# Patient Record
Sex: FEMALE | Race: WHITE | Employment: UNEMPLOYED
[De-identification: names, ages, dates, MRNs, and addresses within clinical notes are randomized per-mention and may not be internally consistent; named-entity substitution may affect disease eponyms.]

---

## 2022-03-19 PROBLEM — Z89.511 HX OF RIGHT BKA (HCC): Status: ACTIVE | Noted: 2021-09-17

## 2022-03-19 PROBLEM — L89.892: Status: ACTIVE | Noted: 2021-09-17

## 2023-04-26 ENCOUNTER — HOSPITAL ENCOUNTER (OUTPATIENT)
Facility: HOSPITAL | Age: 48
Discharge: HOME | End: 2023-04-29

## 2023-04-26 LAB — LABCORP SPECIMEN COLLECTION: NORMAL

## 2023-04-26 PROCEDURE — 99001 SPECIMEN HANDLING PT-LAB: CPT

## 2023-05-17 ENCOUNTER — OFFICE VISIT (OUTPATIENT)
Age: 48
End: 2023-05-17
Payer: COMMERCIAL

## 2023-05-17 VITALS
HEART RATE: 68 BPM | DIASTOLIC BLOOD PRESSURE: 84 MMHG | SYSTOLIC BLOOD PRESSURE: 132 MMHG | WEIGHT: 104 LBS | BODY MASS INDEX: 18.43 KG/M2 | HEIGHT: 63 IN | OXYGEN SATURATION: 99 %

## 2023-05-17 DIAGNOSIS — I44.7 LBBB (LEFT BUNDLE BRANCH BLOCK): Primary | ICD-10-CM

## 2023-05-17 PROCEDURE — 99214 OFFICE O/P EST MOD 30 MIN: CPT | Performed by: INTERNAL MEDICINE

## 2023-05-17 NOTE — PROGRESS NOTES
Elis Avendaño presents today for   Chief Complaint   Patient presents with    Follow-up     Post Nuclear Stress, Echo and Event       Elis Avendaño preferred language for health care discussion is english/other. Is someone accompanying this pt? No    Is the patient using any DME equipment during OV? No      Pt currently taking Anticoagulant therapy? No    Coordination of Care:  1. Have you been to the ER, urgent care clinic since your last visit? Hospitalized since your last visit? no    2. Have you seen or consulted any other health care providers outside of the [address] [place] [address] since your last visit? Include any pap smears or colon screening.  No

## 2023-05-17 NOTE — PROGRESS NOTES
History of Present Illness:  51 YOF here for follow up. She was initially referred for left bundle branch block. Functional status is reasonable and she has a history of left leg amputation and prosthesis. She has no chest pain, dyspnea, PND, orthopnea or edema. When she was wearing the event monitor she did have one episode where she felt a little flushed and did not feel well and based upon the monitor, appears to be in sinus rhythm, sinus tachycardia. There was a short rhythm of an SVT that was not sustained for about 18 seconds. Occasional PACs that were read as PVCs. She is on Toprol. Impression:  History of left bundle branch block diagnosed March 2023 of unknown duration in the setting of mild racing heart and hypertension. Borderline hypertension, on Toprol. History of amputation and prosthesis. Echocardiogram and nuclear stress test May 2023 with septal defect likely related to bundle branch block with dyskinesis, but overall normal function. Recent event monitor with occasional PACs, PVCs, which mostly are aberrancy. There was a short run of SVT 18 seconds nonsustained. Plan: At this point we will continue to monitor and I reviewed the echocardiogram and nuclear stress test.  I see no reason to limit activity at this point. I did recommend minimizing caffeine intake and I also discussed using a home monitor if she has more events. I will plan to see back annually. All questions answered. Wt Readings from Last 3 Encounters:   05/17/23 104 lb (47.2 kg)   05/09/23 105 lb (47.6 kg)   05/09/23 105 lb (47.6 kg)     No past medical history on file.     Current Outpatient Medications   Medication Sig Dispense Refill    buPROPion (WELLBUTRIN XL) 150 MG extended release tablet Take 1 tablet by mouth every 24 hours      metoprolol succinate (TOPROL XL) 25 MG extended release tablet Take 1 tablet by mouth daily (Patient not taking: Reported on 5/17/2023)       No current facility-administered

## 2024-05-22 ENCOUNTER — OFFICE VISIT (OUTPATIENT)
Age: 49
End: 2024-05-22
Payer: COMMERCIAL

## 2024-05-22 VITALS
SYSTOLIC BLOOD PRESSURE: 130 MMHG | OXYGEN SATURATION: 93 % | DIASTOLIC BLOOD PRESSURE: 78 MMHG | BODY MASS INDEX: 19.31 KG/M2 | WEIGHT: 109 LBS | HEART RATE: 59 BPM | HEIGHT: 63 IN

## 2024-05-22 DIAGNOSIS — I44.7 LBBB (LEFT BUNDLE BRANCH BLOCK): Primary | ICD-10-CM

## 2024-05-22 DIAGNOSIS — R00.0 TACHYCARDIA, UNSPECIFIED: ICD-10-CM

## 2024-05-22 DIAGNOSIS — R07.89 OTHER CHEST PAIN: ICD-10-CM

## 2024-05-22 PROCEDURE — 99214 OFFICE O/P EST MOD 30 MIN: CPT | Performed by: INTERNAL MEDICINE

## 2024-05-22 PROCEDURE — 93000 ELECTROCARDIOGRAM COMPLETE: CPT | Performed by: INTERNAL MEDICINE

## 2024-05-22 NOTE — PROGRESS NOTES
History of Present Illness:  49 year-old female here for followup.  She was initially referred for left bundle branch block and she has a history of left leg amputation and prosthesis.  No significant change in functional status over the past year.  She does notice some palpitations every year however, different from the heart racing and she has a history of PACs that were initially read as PVCs.  She is taking Toprol 25 mg daily consistently.  No chest pain or syncope.      Impression:   History of left bundle branch block diagnosed 03/2023 of unknown duration.    Intermittent heart racing.    Mild hypertension on Toprol.    History of amputation and prosthesis.    Echocardiogram and nuclear stress test 05/2023.  Septal defect likely related to bundle branch block and dyskinesis with normal EF.    Event monitor previously with PACs, likely aberrancy.  There was one run of SVT of 18 seconds nonsustained.      Plan:  She has slight increase in some daily palpitations, heart racing at baseline, which sounds more like a sinus tachycardia.  She is taking Toprol 25 mg daily.  I am going to follow up with an event monitor this year.  She has been on Wellbutrin for many years, but it can be a potential stimulant.  There is also potential etiology for perhaps hormonal changes over time.  All questions were answered.  I will plan to see her back annually, as long as her event monitor is unremarkable and I can communicate on My Chart.         Wt Readings from Last 3 Encounters:   05/22/24 49.4 kg (109 lb)   05/17/23 47.2 kg (104 lb)   05/09/23 47.6 kg (105 lb)     No past medical history on file.    Current Outpatient Medications   Medication Sig Dispense Refill    metoprolol succinate (TOPROL XL) 25 MG extended release tablet Take 1 tablet by mouth daily      buPROPion (WELLBUTRIN XL) 150 MG extended release tablet Take 1 tablet by mouth every 24 hours       No current facility-administered medications for this visit.

## 2025-05-22 ENCOUNTER — OFFICE VISIT (OUTPATIENT)
Age: 50
End: 2025-05-22
Payer: COMMERCIAL

## 2025-05-22 VITALS
HEART RATE: 66 BPM | BODY MASS INDEX: 18.96 KG/M2 | SYSTOLIC BLOOD PRESSURE: 120 MMHG | DIASTOLIC BLOOD PRESSURE: 80 MMHG | WEIGHT: 107 LBS | HEIGHT: 63 IN

## 2025-05-22 DIAGNOSIS — I44.7 LBBB (LEFT BUNDLE BRANCH BLOCK): Primary | ICD-10-CM

## 2025-05-22 DIAGNOSIS — R07.89 OTHER CHEST PAIN: ICD-10-CM

## 2025-05-22 DIAGNOSIS — R00.0 TACHYCARDIA, UNSPECIFIED: ICD-10-CM

## 2025-05-22 DIAGNOSIS — I44.7 LEFT BUNDLE BRANCH BLOCK: ICD-10-CM

## 2025-05-22 DIAGNOSIS — I10 PRIMARY HYPERTENSION: ICD-10-CM

## 2025-05-22 PROCEDURE — 99214 OFFICE O/P EST MOD 30 MIN: CPT | Performed by: INTERNAL MEDICINE

## 2025-05-22 PROCEDURE — 93000 ELECTROCARDIOGRAM COMPLETE: CPT | Performed by: INTERNAL MEDICINE

## 2025-05-22 PROCEDURE — 3074F SYST BP LT 130 MM HG: CPT | Performed by: INTERNAL MEDICINE

## 2025-05-22 PROCEDURE — 3079F DIAST BP 80-89 MM HG: CPT | Performed by: INTERNAL MEDICINE

## 2025-05-22 NOTE — PROGRESS NOTES
HPI:  50-year-old female here for followup. She was initially referred for left bundle branch block. She had occasional palpitations, history of PACs, rare PVCs. She has been taking Toprol regularly. Actually, she has been doing quite well despite an increase in external stressors. No chest pain, dyspnea, PND, orthopnea or edema. Weight is stable.    Impression:   1. History left bundle branch block diagnosed March 2023 of unknown duration.   2. Intermittent heart racing, palpitations, PACS and PVCs with aberrancy, stable on Toprol. There was a short run of SVT of 18 seconds, nonsustained. Symptoms have improved.   3. History of previous amputation and prosthesis.   4. Echocardiogram and nuclear stress test May 2023 with septal defect, likely related bundle branch block but normal EF.     Plan:  Her palpitations are well-controlled on Toprol but she has a history of left bundle branch block. Given the concern for structural heart disease, I am going to repeat an echocardiogram this year. Blood pressure is a little high for her baseline but she was rushing in traffic. I can see back annually unless there is a clinical change and assuming that her echocardiogram is normal.        Wt Readings from Last 3 Encounters:   05/22/25 48.5 kg (107 lb)   05/22/24 49.4 kg (109 lb)   05/17/23 47.2 kg (104 lb)     No past medical history on file.    Current Outpatient Medications   Medication Sig Dispense Refill    metoprolol succinate (TOPROL XL) 25 MG extended release tablet Take 1 tablet by mouth daily      buPROPion (WELLBUTRIN XL) 150 MG extended release tablet Take 1 tablet by mouth every 24 hours       No current facility-administered medications for this visit.       Social History   reports that she has never smoked. She has never used smokeless tobacco.   reports current alcohol use of about 3.0 standard drinks of alcohol per week.    Family History  family history is not on file.    Review of Systems  Except as stated